# Patient Record
Sex: FEMALE | Race: BLACK OR AFRICAN AMERICAN | NOT HISPANIC OR LATINO | Employment: FULL TIME | URBAN - METROPOLITAN AREA
[De-identification: names, ages, dates, MRNs, and addresses within clinical notes are randomized per-mention and may not be internally consistent; named-entity substitution may affect disease eponyms.]

---

## 2019-10-19 ENCOUNTER — HISTORICAL (OUTPATIENT)
Dept: ADMINISTRATIVE | Facility: HOSPITAL | Age: 24
End: 2019-10-19

## 2019-10-19 LAB
BILIRUB SERPL-MCNC: NEGATIVE MG/DL
BLOOD URINE, POC: NORMAL
CLARITY, POC UA: CLEAR
COLOR, POC UA: NORMAL
GLUCOSE UR QL STRIP: NEGATIVE
KETONES UR QL STRIP: NEGATIVE
LEUKOCYTE EST, POC UA: NORMAL
NITRITE, POC UA: NEGATIVE
PH, POC UA: 8.5
PROTEIN, POC: NORMAL
SPECIFIC GRAVITY, POC UA: 1.01
UROBILINOGEN, POC UA: NORMAL

## 2019-10-21 LAB — FINAL CULTURE: NO GROWTH

## 2020-05-22 ENCOUNTER — HISTORICAL (OUTPATIENT)
Dept: ADMINISTRATIVE | Facility: HOSPITAL | Age: 25
End: 2020-05-22

## 2020-05-22 LAB
ALBUMIN SERPL-MCNC: 4.5 G/DL (ref 3.9–5)
ALBUMIN/GLOB SERPL: 1.5 {RATIO} (ref 1.2–2.2)
ALP SERPL-CCNC: 45 IU/L (ref 39–117)
ALT SERPL-CCNC: 10 IU/L (ref 0–32)
AST SERPL-CCNC: 15 IU/L (ref 0–40)
BASOPHILS # BLD AUTO: 0 X10E3/UL (ref 0–0.2)
BASOPHILS NFR BLD AUTO: 1 %
BILIRUB SERPL-MCNC: 0.7 MG/DL (ref 0–1.2)
BUN SERPL-MCNC: 13 MG/DL (ref 6–20)
CALCIUM SERPL-MCNC: 9.7 MG/DL (ref 8.7–10.2)
CHLORIDE SERPL-SCNC: 103 MMOL/L (ref 96–106)
CHOLEST SERPL-MCNC: 159 MG/DL (ref 100–199)
CHOLEST/HDLC SERPL: 3.5 RATIO (ref 0–4.4)
CO2 SERPL-SCNC: 22 MMOL/L (ref 20–29)
CREAT SERPL-MCNC: 0.78 MG/DL (ref 0.57–1)
CREAT/UREA NIT SERPL: 17 (ref 9–23)
DEPRECATED CALCIDIOL+CALCIFEROL SERPL-MC: 44.1 NG/ML (ref 30–100)
EOSINOPHIL # BLD AUTO: 0 X10E3/UL (ref 0–0.4)
EOSINOPHIL NFR BLD AUTO: 1 %
ERYTHROCYTE [DISTWIDTH] IN BLOOD BY AUTOMATED COUNT: 12.7 % (ref 11.7–15.4)
GLOBULIN SER-MCNC: 3 G/DL (ref 1.5–4.5)
GLUCOSE SERPL-MCNC: 74 MG/DL (ref 65–99)
HCT VFR BLD AUTO: 39.8 % (ref 34–46.6)
HDLC SERPL-MCNC: 45 MG/DL
HGB BLD-MCNC: 13.1 G/DL (ref 11.1–15.9)
LDLC SERPL CALC-MCNC: 100 MG/DL (ref 0–99)
LYMPHOCYTES # BLD AUTO: 1.9 X10E3/UL (ref 0.7–3.1)
LYMPHOCYTES NFR BLD AUTO: 27 %
MCH RBC QN AUTO: 31 PG (ref 26.6–33)
MCHC RBC AUTO-ENTMCNC: 32.9 G/DL (ref 31.5–35.7)
MCV RBC AUTO: 94 FL (ref 79–97)
MONOCYTES # BLD AUTO: 0.3 X10E3/UL (ref 0.1–0.9)
MONOCYTES NFR BLD AUTO: 5 %
NEUTROPHILS # BLD AUTO: 4.7 X10E3/UL (ref 1.4–7)
NEUTROPHILS NFR BLD AUTO: 66 %
PLATELET # BLD AUTO: 378 X10E3/UL (ref 150–450)
POTASSIUM SERPL-SCNC: 4.4 MMOL/L (ref 3.5–5.2)
PROT SERPL-MCNC: 7.5 G/DL (ref 6–8.5)
RBC # BLD AUTO: 4.23 X10(6)/MCL (ref 3.77–5.28)
SODIUM SERPL-SCNC: 136 MMOL/L (ref 134–144)
TRIGL SERPL-MCNC: 69 MG/DL (ref 0–149)
TSH SERPL-ACNC: 0.68 MIU/ML (ref 0.45–4.5)
VLDLC SERPL CALC-MCNC: 14 MG/DL (ref 5–40)
WBC # SPEC AUTO: 7 X10E3/UL (ref 3.4–10.8)

## 2020-10-15 ENCOUNTER — HISTORICAL (OUTPATIENT)
Dept: RADIOLOGY | Facility: HOSPITAL | Age: 25
End: 2020-10-15

## 2021-04-19 LAB
BILIRUB SERPL-MCNC: NEGATIVE MG/DL
BLOOD URINE, POC: NORMAL
CLARITY, POC UA: CLEAR
COLOR, POC UA: YELLOW
GLUCOSE UR QL STRIP: NEGATIVE
KETONES UR QL STRIP: NEGATIVE
LEUKOCYTE EST, POC UA: NEGATIVE
NITRITE, POC UA: NEGATIVE
PH, POC UA: 7
POC BETA-HCG (QUAL): NEGATIVE
PROTEIN, POC: NEGATIVE
SPECIFIC GRAVITY, POC UA: 1.01
UROBILINOGEN, POC UA: NORMAL

## 2021-06-10 ENCOUNTER — HISTORICAL (OUTPATIENT)
Dept: ADMINISTRATIVE | Facility: HOSPITAL | Age: 26
End: 2021-06-10

## 2021-06-10 LAB
ABS NEUT (OLG): 2.06 X10(3)/MCL (ref 2.1–9.2)
ALBUMIN SERPL-MCNC: 3.4 GM/DL (ref 3.5–5)
ALBUMIN/GLOB SERPL: 1 RATIO (ref 1.1–2)
ALP SERPL-CCNC: 50 UNIT/L (ref 40–150)
ALT SERPL-CCNC: 6 UNIT/L (ref 0–55)
AST SERPL-CCNC: 13 UNIT/L (ref 5–34)
BASOPHILS # BLD AUTO: 0 X10(3)/MCL (ref 0–0.2)
BASOPHILS NFR BLD AUTO: 0 %
BILIRUB SERPL-MCNC: 0.9 MG/DL
BILIRUBIN DIRECT+TOT PNL SERPL-MCNC: 0.3 MG/DL (ref 0–0.5)
BILIRUBIN DIRECT+TOT PNL SERPL-MCNC: 0.6 MG/DL (ref 0–0.8)
BUN SERPL-MCNC: 9.1 MG/DL (ref 7–18.7)
CALCIUM SERPL-MCNC: 9 MG/DL (ref 8.4–10.2)
CHLORIDE SERPL-SCNC: 107 MMOL/L (ref 98–107)
CHOLEST SERPL-MCNC: 158 MG/DL
CHOLEST/HDLC SERPL: 4 {RATIO} (ref 0–5)
CO2 SERPL-SCNC: 24 MMOL/L (ref 22–29)
CREAT SERPL-MCNC: 0.71 MG/DL (ref 0.55–1.02)
DEPRECATED CALCIDIOL+CALCIFEROL SERPL-MC: 50.4 NG/ML (ref 30–80)
EOSINOPHIL # BLD AUTO: 0.1 X10(3)/MCL (ref 0–0.9)
EOSINOPHIL NFR BLD AUTO: 2 %
ERYTHROCYTE [DISTWIDTH] IN BLOOD BY AUTOMATED COUNT: 12.6 % (ref 11.5–17)
GLOBULIN SER-MCNC: 3.5 GM/DL (ref 2.4–3.5)
GLUCOSE SERPL-MCNC: 72 MG/DL (ref 74–100)
HCT VFR BLD AUTO: 38.5 % (ref 37–47)
HDLC SERPL-MCNC: 39 MG/DL (ref 35–60)
HGB BLD-MCNC: 12.9 GM/DL (ref 12–16)
IRON SATN MFR SERPL: 43 % (ref 20–50)
IRON SERPL-MCNC: 144 UG/DL (ref 50–170)
LDLC SERPL CALC-MCNC: 102 MG/DL (ref 50–140)
LYMPHOCYTES # BLD AUTO: 1.5 X10(3)/MCL (ref 0.6–4.6)
LYMPHOCYTES NFR BLD AUTO: 38 %
MCH RBC QN AUTO: 31 PG (ref 27–31)
MCHC RBC AUTO-ENTMCNC: 33.5 GM/DL (ref 33–36)
MCV RBC AUTO: 92.5 FL (ref 80–94)
MONOCYTES # BLD AUTO: 0.3 X10(3)/MCL (ref 0.1–1.3)
MONOCYTES NFR BLD AUTO: 7 %
NEUTROPHILS # BLD AUTO: 2.06 X10(3)/MCL (ref 2.1–9.2)
NEUTROPHILS NFR BLD AUTO: 53 %
PLATELET # BLD AUTO: 409 X10(3)/MCL (ref 130–400)
PMV BLD AUTO: 8.9 FL (ref 9.4–12.4)
POTASSIUM SERPL-SCNC: 4.3 MMOL/L (ref 3.5–5.1)
PROT SERPL-MCNC: 6.9 GM/DL (ref 6.4–8.3)
RBC # BLD AUTO: 4.16 X10(6)/MCL (ref 4.2–5.4)
SODIUM SERPL-SCNC: 139 MMOL/L (ref 136–145)
TIBC SERPL-MCNC: 191 UG/DL (ref 70–310)
TIBC SERPL-MCNC: 335 UG/DL (ref 250–450)
TRANSFERRIN SERPL-MCNC: 298 MG/DL (ref 180–382)
TRIGL SERPL-MCNC: 87 MG/DL (ref 37–140)
TSH SERPL-ACNC: 1.25 UIU/ML (ref 0.35–4.94)
VLDLC SERPL CALC-MCNC: 17 MG/DL
WBC # SPEC AUTO: 3.9 X10(3)/MCL (ref 4.5–11.5)

## 2021-07-12 LAB
BILIRUB SERPL-MCNC: NORMAL MG/DL
BLOOD URINE, POC: NEGATIVE
CLARITY, POC UA: CLEAR
COLOR, POC UA: YELLOW
GLUCOSE UR QL STRIP: NEGATIVE
KETONES UR QL STRIP: NEGATIVE
LEUKOCYTE EST, POC UA: NEGATIVE
NITRITE, POC UA: NEGATIVE
PH, POC UA: 7
PROTEIN, POC: NEGATIVE
SPECIFIC GRAVITY, POC UA: 1.01
UROBILINOGEN, POC UA: NORMAL

## 2021-10-17 ENCOUNTER — HISTORICAL (OUTPATIENT)
Dept: URGENT CARE | Facility: CLINIC | Age: 26
End: 2021-10-17

## 2022-01-04 ENCOUNTER — HISTORICAL (OUTPATIENT)
Dept: URGENT CARE | Facility: CLINIC | Age: 27
End: 2022-01-04

## 2022-01-04 LAB
APPEARANCE, UA: CLEAR
BACTERIA SPEC CULT: ABNORMAL /HPF
BILIRUB SERPL-MCNC: NEGATIVE MG/DL
BILIRUB UR QL STRIP: NEGATIVE
BLOOD URINE, POC: NORMAL
CLARITY, POC UA: NORMAL
COLOR UR: YELLOW
COLOR, POC UA: NORMAL
GLUCOSE (UA): NEGATIVE
GLUCOSE UR QL STRIP: NEGATIVE
HGB UR QL STRIP: ABNORMAL
KETONES UR QL STRIP: NEGATIVE
KETONES UR QL STRIP: NEGATIVE
LEUKOCYTE EST, POC UA: NORMAL
LEUKOCYTE ESTERASE UR QL STRIP: ABNORMAL
NITRITE UR QL STRIP: NEGATIVE
NITRITE, POC UA: NEGATIVE
PH UR STRIP: 7 [PH] (ref 5–9)
PH, POC UA: 6.5
PROT UR QL STRIP: NEGATIVE
PROTEIN, POC: NEGATIVE
RAPID GROUP A STREP (OHS): NEGATIVE
RBC #/AREA URNS HPF: 21 /HPF (ref 0–2)
SARS-COV-2 RNA RESP QL NAA+PROBE: POSITIVE
SP GR UR STRIP: 1.02 (ref 1–1.03)
SPECIFIC GRAVITY, POC UA: 1.01
SQUAMOUS EPITHELIAL, UA: ABNORMAL /HPF (ref 0–4)
UROBILINOGEN UR STRIP-ACNC: 1
UROBILINOGEN, POC UA: NORMAL
WBC #/AREA URNS AUTO: ABNORMAL /HPF (ref 0–3)
WBC #/AREA URNS HPF: ABNORMAL /[HPF]

## 2022-01-14 ENCOUNTER — HISTORICAL (OUTPATIENT)
Dept: PREADMISSION TESTING | Facility: HOSPITAL | Age: 27
End: 2022-01-14

## 2022-01-14 LAB
% NEUTROPHILS (OHS): 50 %
ABS NEUT (OLG): 2.19 X10(3)/MCL (ref 2.1–9.2)
BASOPHILS # BLD AUTO: 0 X10(3)/MCL (ref 0–0.2)
BASOPHILS NFR BLD AUTO: 1 %
BASOPHILS NFR BLD: 0 K/UL (ref 0–0.2)
BASOPHILS NFR BLD: 1 % (ref 0–3)
EOSINOPHIL # BLD AUTO: 0 X10(3)/MCL (ref 0–0.9)
EOSINOPHIL NFR BLD AUTO: 1 %
EOSINOPHIL NFR BLD: 0 K/UL (ref 0–0.9)
EOSINOPHIL NFR BLD: 1 %
ERYTHROCYTE [DISTWIDTH] IN BLOOD BY AUTOMATED COUNT: 12.3 % (ref 11.5–17)
HCT VFR BLD AUTO: 37.2 % (ref 37–47)
HCT VFR BLD AUTO: 37.2 % (ref 37–47)
HGB BLD-MCNC: 12.2 G/DL (ref 12–16)
HGB BLD-MCNC: 12.2 GM/DL (ref 12–16)
LYMPH #: 1.7 K/UL (ref 0.6–4.6)
LYMPH%: 40 % (ref 18–52)
LYMPHOCYTES # BLD AUTO: 1.7 X10(3)/MCL (ref 0.6–4.6)
LYMPHOCYTES NFR BLD AUTO: 40 %
MCH RBC QN AUTO: 30.9 PG (ref 27–31)
MCH RBC QN AUTO: 30.9 PG (ref 27–31)
MCHC RBC AUTO-ENTMCNC: 32.8 G/DL (ref 33–36)
MCHC RBC AUTO-ENTMCNC: 32.8 GM/DL (ref 33–36)
MCV RBC AUTO: 94.2 FL (ref 80–94)
MCV RBC AUTO: 94.2 FL (ref 80–94)
MONO #: 0.4 K/UL (ref 0.1–1.3)
MONO%: 8 % (ref 3–10)
MONOCYTES # BLD AUTO: 0.4 X10(3)/MCL (ref 0.1–1.3)
MONOCYTES NFR BLD AUTO: 8 %
MPC BLD CALC-MCNC: 9.2 FL (ref 9.4–12.4)
NEUTROPHILS # BLD AUTO: 2.19 X10(3)/MCL (ref 2.1–9.2)
NEUTROPHILS - ABS (DIFF): 2 /ΜL (ref 2–9)
NEUTROPHILS NFR BLD AUTO: 50 %
PLATELET # BLD AUTO: 404 K/UL (ref 130–400)
PLATELET # BLD AUTO: 404 X10(3)/MCL (ref 130–400)
PMV BLD AUTO: 9.2 FL (ref 9.4–12.4)
RBC # BLD AUTO: 3.95 M/UL (ref 4.2–5.4)
RBC # BLD AUTO: 3.95 X10(6)/MCL (ref 4.2–5.4)
RDW-CV: 12.3 % (ref 11.5–17)
WBC # SPEC AUTO: 4.3 X10(3)/MCL (ref 4.5–11.5)
WBC: 4.3 K/UL (ref 4.5–11.5)

## 2022-04-10 ENCOUNTER — HISTORICAL (OUTPATIENT)
Dept: ADMINISTRATIVE | Facility: HOSPITAL | Age: 27
End: 2022-04-10
Payer: COMMERCIAL

## 2022-04-27 VITALS
HEIGHT: 63 IN | DIASTOLIC BLOOD PRESSURE: 72 MMHG | SYSTOLIC BLOOD PRESSURE: 114 MMHG | OXYGEN SATURATION: 99 % | WEIGHT: 140 LBS | BODY MASS INDEX: 24.8 KG/M2

## 2022-05-03 NOTE — HISTORICAL [facility]
Ongoing SW/CM Assessment/Plan of Care Note     See SW/CM flowsheets for goals and other objective data.    Patient/Family discharge goal (s):  Goal #1: Communication facilitated  Goal #2: Home Care arranged or issues addressed  Goal #3: Transportation arranged or issues addressed    PT Recommendation:  Recommendation for Discharge: PT: Home(with supervision of spouse/family)    OT Recommendation:  Recommendations for Discharge: OT: Home, Home therapy, 24 Hour assist    SLP Recommendation:       Disposition:   Home with home care    Progress note:   Case discussed in OFTs. Possible weekend d/c pending progress. Writer met with patient and spouse at bedside. 2nd copies of Important Message from Medicare (IMM) forms given and explained.  Copies to be scanned into medical record.  Patient / representative verbalized understanding. Copy signed.    Dr. Solorio will enter home care orders upon d/c. Home care liaison updated on possible weekend d/c. Information added to AVS.         This is a historical note converted from [employer]. Formatting and pictures may have been removed.  Please reference [employer] for original formatting and attached multimedia. Chief Complaint  itching burning and redness x 7 days (used monostat no change)  History of Present Illness  Pt is a 25 yo female, here today for burning, itching, redness x 7 days. Tried monistat with no relief.? Patient states that she has had a yeast infection previously?and her symptoms today are the same.? Denies any new sexual partners, declines STD testing today.? Denies?body aches, fever,?abdominal pain, back pain,?vaginal discharge.  Review of Systems  Constitutional: negative except as stated in HPI  Eye: negative except as stated in HPI  ENT: negative except as stated in HPI  Respiratory: negative except as stated in HPI  Cardiovascular: negative except as stated in HPI  Gastrointestinal: negative except as stated in HPI  Genitourinary: negative except as stated in HPI  Hema/Lymph: negative except as stated in HPI  Endocrine: negative except as stated in HPI  Immunologic: negative except as stated in HPI  Musculoskeletal: negative except as stated in HPI  Integumentary: negative except as stated in HPI  Neurologic: negative except as stated in HPI  All Other ROS_ negative except as stated in HPI  ?  ?  Physical Exam  Vitals & Measurements  T:?36.4? ?C (Oral)? HR:?75(Peripheral)? RR:?20? BP:?116/75? SpO2:?100%?  HT:?160?cm? WT:?64?kg? BMI:?25? LMP:?09/27/2019 00:00 CDT?  ?General Appearance: well developed, well nourished female in no acute distress.  Respiratory: Lungs are clear to auscultation, Respirations are non-labored, Breath sounds are equal, Symmetrical chest wall expansion.  Cardiovascular: Normal rate, Regular rhythm, No murmur.  Abdomen: no mass, significant tenderness or organomegaly  Pelvic: external genitalia is normal.?Discharge?white, wet prep performed, no cervical motion tenderness, adnexae nontender, no masses. Odor?not  present  Genitourinary: no flank pain.  Neurologic: No focal deficits, Cranial Nerves II-XII are grossly intact.  Assessment/Plan  1.?Vaginal yeast infection?B37.3  ?Wet prep performed and sent to lab.?POC UA + leuk, - nit, will send to lab for C&S. ?Diflucan 150 mg p.o. once, may take second dose in 3 days.???Avoid moisture retaining products near vagina, nylon underwear, pantiliners, vaginal lubricants or spermicides.??F/u with pcp. ER precautions.  Ordered:  After Hrs Visit 20054 PC, Vaginal yeast infection  Vaginal itching, 10/19/19 9:50:00 CDT  Office/Outpatient Visit Level 4 Established [id number] PC, Vaginal yeast infection  Vaginal itching, 10/19/19 9:50:00 CDT  Urine Culture [id number], Stat collect, 10/19/19 9:47:00 CDT, Urine, Nurse collect, Stop date 10/19/19 9:48:00 CDT, Vaginal yeast infection  Vaginal itching  Wet Prep Smear, Stat collect, Vaginal, 10/19/19 9:46:00 CDT, Stop date 10/19/19 9:46:00 CDT, Nurse collect, Vaginal yeast infection  Vaginal itching, 10/19/19 9:46:00 CDT  ?  2.?Vaginal itching?N89.8  POC as above  Ordered:  After Hrs Visit 98054 PC, Vaginal yeast infection  Vaginal itching, 10/19/19 9:50:00 CDT  Office/Outpatient Visit Level 4 Established 18617 PC, Vaginal yeast infection  Vaginal itching, 10/19/19 9:50:00 CDT  Urine Culture [id number], Stat collect, 10/19/19 9:47:00 CDT, Urine, Nurse collect, Stop date 10/19/19 9:48:00 CDT, Vaginal yeast infection  Vaginal itching  Wet Prep Smear, Stat collect, Vaginal, 10/19/19 9:46:00 CDT, Stop date 10/19/19 9:46:00 CDT, Nurse collect, Vaginal yeast infection  Vaginal itching, 10/19/19 9:46:00 CDT  ?   Problem List/Past Medical History  Ongoing  No chronic problems  Historical  No qualifying data  Procedure/Surgical History  denies   Medications  alPRAzolam 0.25 mg oral tab, 0.25 mg= 1 tab(s), Oral, TID  busPIRone 5 mg oral tablet, 5 mg= 1 tab(s), Oral, Daily  cetirizine-pseudoephedrine 5 mg-120 mg oral tablet, extended release, 1 tab(s), Oral,  BID,? ?Not Taking, Completed Rx: pt stated she is currently not taking this medication  ethinyl estradiol-levonorgestrel 20 mcg-100 mcg oral tablet, 1 tab(s), Oral, Daily,? ?Not Taking per Prescriber  Promethazine DM 6.25 mg-15 mg/5 mL oral syrup, 5 mL, Oral, q6hr, PRN,? ?Not Taking, Completed Rx: pt stated she is done taking this medicationpt stated she is currently not taking this medication  Seasonique oral tablet, 1 tab(s), Oral, Daily  Allergies  No Known Medication Allergies  Social History  Abuse/Neglect  No, 10/19/2019  Alcohol  Current, 1-2 times per month, 03/26/2019  Tobacco  Never (less than 100 in lifetime), N/A, Household tobacco concerns: No., 10/19/2019  Health Maintenance  Health Maintenance  ???Pending?(in the next year)  ??? ??OverDue  ??? ? ? ?Alcohol Misuse Screening due??01/01/19??and every 1??year(s)  ??? ??Due?  ??? ? ? ?ADL Screening due??10/19/19??and every 1??year(s)  ??? ? ? ?Tetanus Vaccine due??10/19/19??and every 10??year(s)  ??? ??Due In Future?  ??? ? ? ?Obesity Screening not due until??01/01/20??and every 1??year(s)  ???Satisfied?(in the past 1 year)  ??? ??Satisfied?  ??? ? ? ?Blood Pressure Screening on??10/19/19.??Satisfied by Lis Womack LPN  ??? ? ? ?Body Mass Index Check on??10/19/19.??Satisfied by Lis Womack LPN  ??? ? ? ?Influenza Vaccine on??10/19/19.??Satisfied by Lis Womack LPN  ??? ? ? ?Obesity Screening on??10/19/19.??Satisfied by Lis Womack LPN  ?  Diagnostic Results  Urinalysis????????  Color Ur Dipstick????????Dark yellow  Appearance Ur Dipstick????????Clear  pH Ur Dipstick????????8.5  Specific Gravity Ur Dipstick????????1.015  Blood Ur Dipstick????????Trace  Glucose Ur Dipstick????????Negative  Ketones Ur Dipstick????????Negative  Protein Ur Dipstick????????1+ (30 mg/dl)  Bilirubin Ur Dipstick????????Negative  Urobilinogen Ur Dipstick????????1 mg/dl  Leukocytes Ur Dipstick????????Small  Nitrite Ur Dipstick????????Negative  ?

## 2022-05-19 LAB — PAP RECOMMENDATION EXT: NORMAL

## 2022-06-03 DIAGNOSIS — Z00.00 ANNUAL PHYSICAL EXAM: Primary | ICD-10-CM

## 2022-06-03 PROBLEM — F41.1 GENERALIZED ANXIETY DISORDER: Status: ACTIVE | Noted: 2022-06-03

## 2022-06-03 PROBLEM — K63.5 POLYP OF COLON: Status: ACTIVE | Noted: 2022-06-03

## 2022-06-03 PROBLEM — N92.0 MENORRHAGIA: Status: ACTIVE | Noted: 2022-06-03

## 2022-06-03 PROBLEM — D25.9 UTERINE FIBROID: Status: ACTIVE | Noted: 2022-06-03

## 2022-06-03 PROBLEM — K58.9 IRRITABLE BOWEL SYNDROME: Status: ACTIVE | Noted: 2022-06-03

## 2022-06-03 PROBLEM — K52.9 CHRONIC DIARRHEA: Status: ACTIVE | Noted: 2022-06-03

## 2022-06-03 PROBLEM — N94.6 DYSMENORRHEA: Status: ACTIVE | Noted: 2022-06-03

## 2022-06-14 ENCOUNTER — OFFICE VISIT (OUTPATIENT)
Dept: FAMILY MEDICINE | Facility: CLINIC | Age: 27
End: 2022-06-14
Payer: COMMERCIAL

## 2022-06-14 VITALS
OXYGEN SATURATION: 98 % | DIASTOLIC BLOOD PRESSURE: 74 MMHG | SYSTOLIC BLOOD PRESSURE: 104 MMHG | HEART RATE: 72 BPM | HEIGHT: 63 IN | BODY MASS INDEX: 27.07 KG/M2 | TEMPERATURE: 99 F | RESPIRATION RATE: 16 BRPM | WEIGHT: 152.81 LBS

## 2022-06-14 DIAGNOSIS — Z11.59 NEED FOR HEPATITIS C SCREENING TEST: ICD-10-CM

## 2022-06-14 DIAGNOSIS — N30.01 ACUTE CYSTITIS WITH HEMATURIA: ICD-10-CM

## 2022-06-14 DIAGNOSIS — R10.9 ABDOMINAL PAIN, UNSPECIFIED ABDOMINAL LOCATION: ICD-10-CM

## 2022-06-14 DIAGNOSIS — Z00.00 ANNUAL PHYSICAL EXAM: Primary | ICD-10-CM

## 2022-06-14 LAB
BILIRUB SERPL-MCNC: ABNORMAL MG/DL
BLOOD URINE, POC: ABNORMAL
CLARITY, POC UA: CLEAR
COLOR, POC UA: ABNORMAL
GLUCOSE UR QL STRIP: ABNORMAL
KETONES UR QL STRIP: ABNORMAL
LEUKOCYTE ESTERASE URINE, POC: ABNORMAL
NITRITE, POC UA: ABNORMAL
PH, POC UA: 5.5
PROTEIN, POC: ABNORMAL
SPECIFIC GRAVITY, POC UA: 1.01
UROBILINOGEN, POC UA: ABNORMAL

## 2022-06-14 PROCEDURE — 1160F PR REVIEW ALL MEDS BY PRESCRIBER/CLIN PHARMACIST DOCUMENTED: ICD-10-PCS | Mod: CPTII,,, | Performed by: NURSE PRACTITIONER

## 2022-06-14 PROCEDURE — 3074F PR MOST RECENT SYSTOLIC BLOOD PRESSURE < 130 MM HG: ICD-10-PCS | Mod: CPTII,,, | Performed by: NURSE PRACTITIONER

## 2022-06-14 PROCEDURE — 1159F MED LIST DOCD IN RCRD: CPT | Mod: CPTII,,, | Performed by: NURSE PRACTITIONER

## 2022-06-14 PROCEDURE — 81002 POCT URINE DIPSTICK WITHOUT MICROSCOPE: ICD-10-PCS | Mod: ,,, | Performed by: NURSE PRACTITIONER

## 2022-06-14 PROCEDURE — 99395 PR PREVENTIVE VISIT,EST,18-39: ICD-10-PCS | Mod: ,,, | Performed by: NURSE PRACTITIONER

## 2022-06-14 PROCEDURE — 3078F DIAST BP <80 MM HG: CPT | Mod: CPTII,,, | Performed by: NURSE PRACTITIONER

## 2022-06-14 PROCEDURE — 81002 URINALYSIS NONAUTO W/O SCOPE: CPT | Mod: ,,, | Performed by: NURSE PRACTITIONER

## 2022-06-14 PROCEDURE — 1160F RVW MEDS BY RX/DR IN RCRD: CPT | Mod: CPTII,,, | Performed by: NURSE PRACTITIONER

## 2022-06-14 PROCEDURE — 87088 URINE BACTERIA CULTURE: CPT | Performed by: NURSE PRACTITIONER

## 2022-06-14 PROCEDURE — 1159F PR MEDICATION LIST DOCUMENTED IN MEDICAL RECORD: ICD-10-PCS | Mod: CPTII,,, | Performed by: NURSE PRACTITIONER

## 2022-06-14 PROCEDURE — 3074F SYST BP LT 130 MM HG: CPT | Mod: CPTII,,, | Performed by: NURSE PRACTITIONER

## 2022-06-14 PROCEDURE — 3008F PR BODY MASS INDEX (BMI) DOCUMENTED: ICD-10-PCS | Mod: CPTII,,, | Performed by: NURSE PRACTITIONER

## 2022-06-14 PROCEDURE — 3078F PR MOST RECENT DIASTOLIC BLOOD PRESSURE < 80 MM HG: ICD-10-PCS | Mod: CPTII,,, | Performed by: NURSE PRACTITIONER

## 2022-06-14 PROCEDURE — 3008F BODY MASS INDEX DOCD: CPT | Mod: CPTII,,, | Performed by: NURSE PRACTITIONER

## 2022-06-14 PROCEDURE — 99395 PREV VISIT EST AGE 18-39: CPT | Mod: ,,, | Performed by: NURSE PRACTITIONER

## 2022-06-14 RX ORDER — NITROFURANTOIN 25; 75 MG/1; MG/1
100 CAPSULE ORAL 2 TIMES DAILY
Qty: 14 CAPSULE | Refills: 0 | Status: SHIPPED | OUTPATIENT
Start: 2022-06-14 | End: 2023-03-07 | Stop reason: ALTCHOICE

## 2022-06-14 RX ORDER — OXYCODONE AND ACETAMINOPHEN 5; 325 MG/1; MG/1
1 TABLET ORAL EVERY 6 HOURS PRN
COMMUNITY
Start: 2022-01-17 | End: 2023-03-07 | Stop reason: ALTCHOICE

## 2022-06-14 RX ORDER — IBUPROFEN 600 MG/1
600 TABLET ORAL EVERY 6 HOURS PRN
COMMUNITY
Start: 2022-01-14 | End: 2023-03-07

## 2022-06-14 RX ORDER — TERCONAZOLE 8 MG/G
CREAM VAGINAL
COMMUNITY
Start: 2022-06-06 | End: 2023-03-07

## 2022-06-14 NOTE — PATIENT INSTRUCTIONS
Take Macrobid until complete. We will call you if antibiotic needs to be changed after we receive urine culture results.

## 2022-06-14 NOTE — PROGRESS NOTES
Subjective:       Patient ID: Roz Leonard is a 26 y.o. female.    Chief Complaint: Annual Exam and Urinary Tract Infection (Lower abdominal pressure x 1 and a half weeks)      HPI     This is a 26-year-old white female who presents to clinic today for an annual wellness exam.  Patient has a history of dysmenorrhea, menorrhagia, uterine fibroid, irritable bowel syndrome, colon polyp.  Patient states that she is currently being treated for chronic [place] infection by her OBGYN.  Has been having lower abdominal/bladder pain pressure for the last week and a half.  Also some urinary frequency.  No dysuria.      Review of Systems   Constitutional: Negative.    HENT: Negative.    Eyes: Negative.    Respiratory: Negative.    Cardiovascular: Negative.    Gastrointestinal: Positive for abdominal pain.   Endocrine: Negative.    Genitourinary: Positive for frequency and urgency.   Musculoskeletal: Negative.    Integumentary:  Negative.   Allergic/Immunologic: Negative.    Neurological: Negative.    Hematological: Negative.    Psychiatric/Behavioral: Negative.    All other systems reviewed and are negative.          The patient's Health Maintenance was reviewed and the following appears to be due:   Health Maintenance Due   Topic Date Due    Hepatitis C Screening  Never done    Pneumococcal Vaccines (Age 0-64) (1 - PCV) Never done    HIV Screening  Never done    TETANUS VACCINE  08/03/2017    COVID-19 Vaccine (3 - Booster for Moderna series) 09/01/2021       Past Medical History:  Past Medical History:   Diagnosis Date    Acute pelvic pain     Anxiety disorder, unspecified     Chronic diarrhea     Dysmenorrhea, unspecified     Fibroids     IBS (irritable bowel syndrome)     Menorrhagia     Personal history of colonic polyps      Past Surgical History:   Procedure Laterality Date    COLONOSCOPY  06/08/2021    MYOMECTOMY  01/18/2022     Review of patient's allergies indicates:   Allergen Reactions    Hydrocodone  "Anxiety     Current Outpatient Medications on File Prior to Visit   Medication Sig Dispense Refill    ibuprofen (ADVIL,MOTRIN) 600 MG tablet Take 600 mg by mouth every 6 (six) hours as needed.      terconazole (TERAZOL 3) 0.8 % vaginal cream Place vaginally.      oxyCODONE-acetaminophen (PERCOCET) 5-325 mg per tablet Take 1 tablet by mouth every 6 (six) hours as needed.       No current facility-administered medications on file prior to visit.     Social History     Socioeconomic History    Marital status: Single   Tobacco Use    Smoking status: Current Some Day Smoker     Types: Vaping with nicotine    Smokeless tobacco: Never Used   Substance and Sexual Activity    Alcohol use: Yes     Comment: socially    Drug use: Never    Sexual activity: Yes     History reviewed. No pertinent family history.      Objective:       /74 (BP Location: Left arm)   Pulse 72   Temp 99.1 °F (37.3 °C) (Oral)   Resp 16   Ht 5' 3" (1.6 m)   Wt 69.3 kg (152 lb 12.8 oz)   LMP 06/06/2022   SpO2 98%   BMI 27.07 kg/m²      Physical Exam  Vitals and nursing note reviewed.   Constitutional:       Appearance: Normal appearance. She is normal weight.   HENT:      Head: Normocephalic and atraumatic.      Right Ear: Tympanic membrane, ear canal and external ear normal.      Left Ear: Tympanic membrane, ear canal and external ear normal.      Nose: Nose normal.      Mouth/Throat:      Mouth: Mucous membranes are moist.      Pharynx: Oropharynx is clear.   Eyes:      Extraocular Movements: Extraocular movements intact.      Conjunctiva/sclera: Conjunctivae normal.      Pupils: Pupils are equal, round, and reactive to light.   Cardiovascular:      Rate and Rhythm: Normal rate and regular rhythm.      Heart sounds: Normal heart sounds.   Pulmonary:      Effort: Pulmonary effort is normal.      Breath sounds: Normal breath sounds.   Abdominal:      General: Abdomen is flat. Bowel sounds are normal.      Palpations: Abdomen is " soft.   Musculoskeletal:         General: Normal range of motion.      Cervical back: Normal range of motion and neck supple.   Skin:     General: Skin is warm and dry.   Neurological:      General: No focal deficit present.      Mental Status: She is alert and oriented to person, place, and time.   Psychiatric:         Mood and Affect: Mood normal.         Behavior: Behavior normal.         Thought Content: Thought content normal.         Judgment: Judgment normal.         Labs  Office Visit on 06/14/2022   Component Date Value Ref Range Status    Color, UA 06/14/2022 Straw   Final    pH, UA 06/14/2022 5.5   Final    WBC, UA 06/14/2022 trace   Final    Nitrite, UA 06/14/2022 neg   Final    Protein, POC 06/14/2022 neg   Final    Glucose, UA 06/14/2022 neg   Final    Ketones, UA 06/14/2022 neg   Final    Urobilinogen, UA 06/14/2022 neg   Final    Bilirubin, POC 06/14/2022 neg   Final    Blood, UA 06/14/2022 large   Final    Clarity, UA 06/14/2022 Clear   Final    Spec Grav UA 06/14/2022 1.015   Final             Assessment:       Problem List Items Addressed This Visit    None     Visit Diagnoses     Acute cystitis with hematuria    -  Primary    Relevant Medications    nitrofurantoin, macrocrystal-monohydrate, (MACROBID) 100 MG capsule    Other Relevant Orders    Urine culture    Annual physical exam        Abdominal pain, unspecified abdominal location        Relevant Orders    POCT URINE DIPSTICK WITHOUT MICROSCOPE (Completed)    Need for hepatitis C screening test        Relevant Orders    Hepatitis C Antibody          Plan:         1. Annual physical exam  Patient will go for fasting labs tomorrow morning, will send [email or web address] message with results.    2. Abdominal pain, unspecified abdominal location  Urinalysis in clinic  - POCT URINE DIPSTICK WITHOUT MICROSCOPE    3. Need for hepatitis C screening test  Hep C screening added to labs  - Hepatitis C Antibody; Future    4. Acute cystitis with  hematuria  Macrobid as directed, urine culture  - Urine culture  - nitrofurantoin, macrocrystal-monohydrate, (MACROBID) 100 MG capsule; Take 1 capsule (100 mg total) by mouth 2 (two) times daily.  Dispense: 14 capsule; Refill: 0

## 2022-06-17 LAB — BACTERIA UR CULT: NO GROWTH

## 2022-09-21 ENCOUNTER — HISTORICAL (OUTPATIENT)
Dept: ADMINISTRATIVE | Facility: HOSPITAL | Age: 27
End: 2022-09-21

## 2023-01-17 LAB
CHLAMYDIA BY NAA: NEGATIVE
GONOCOCCUS BY NAA: NEGATIVE
TRICH VAG BY NAA: NEGATIVE

## 2023-03-07 ENCOUNTER — OFFICE VISIT (OUTPATIENT)
Dept: FAMILY MEDICINE | Facility: CLINIC | Age: 28
End: 2023-03-07
Payer: COMMERCIAL

## 2023-03-07 ENCOUNTER — DOCUMENTATION ONLY (OUTPATIENT)
Dept: FAMILY MEDICINE | Facility: CLINIC | Age: 28
End: 2023-03-07

## 2023-03-07 VITALS
OXYGEN SATURATION: 99 % | BODY MASS INDEX: 27.22 KG/M2 | WEIGHT: 153.63 LBS | SYSTOLIC BLOOD PRESSURE: 104 MMHG | HEART RATE: 57 BPM | HEIGHT: 63 IN | DIASTOLIC BLOOD PRESSURE: 64 MMHG | RESPIRATION RATE: 16 BRPM | TEMPERATURE: 99 F

## 2023-03-07 DIAGNOSIS — R41.840 IMPAIRED ATTENTION: ICD-10-CM

## 2023-03-07 DIAGNOSIS — R53.83 FATIGUE, UNSPECIFIED TYPE: Primary | ICD-10-CM

## 2023-03-07 DIAGNOSIS — E55.9 VITAMIN D DEFICIENCY: ICD-10-CM

## 2023-03-07 DIAGNOSIS — B37.31 YEAST VAGINITIS: ICD-10-CM

## 2023-03-07 LAB
EST. AVERAGE GLUCOSE BLD GHB EST-MCNC: 102.5 MG/DL
HBA1C MFR BLD: 5.2 %
TSH SERPL-ACNC: 1.48 UIU/ML (ref 0.35–4.94)

## 2023-03-07 PROCEDURE — 84443 ASSAY THYROID STIM HORMONE: CPT | Performed by: NURSE PRACTITIONER

## 2023-03-07 PROCEDURE — 3078F DIAST BP <80 MM HG: CPT | Mod: CPTII,,, | Performed by: NURSE PRACTITIONER

## 2023-03-07 PROCEDURE — 3074F SYST BP LT 130 MM HG: CPT | Mod: CPTII,,, | Performed by: NURSE PRACTITIONER

## 2023-03-07 PROCEDURE — 99214 PR OFFICE/OUTPT VISIT, EST, LEVL IV, 30-39 MIN: ICD-10-PCS | Mod: ,,, | Performed by: NURSE PRACTITIONER

## 2023-03-07 PROCEDURE — 1159F PR MEDICATION LIST DOCUMENTED IN MEDICAL RECORD: ICD-10-PCS | Mod: CPTII,,, | Performed by: NURSE PRACTITIONER

## 2023-03-07 PROCEDURE — 3008F BODY MASS INDEX DOCD: CPT | Mod: CPTII,,, | Performed by: NURSE PRACTITIONER

## 2023-03-07 PROCEDURE — 3074F PR MOST RECENT SYSTOLIC BLOOD PRESSURE < 130 MM HG: ICD-10-PCS | Mod: CPTII,,, | Performed by: NURSE PRACTITIONER

## 2023-03-07 PROCEDURE — 3078F PR MOST RECENT DIASTOLIC BLOOD PRESSURE < 80 MM HG: ICD-10-PCS | Mod: CPTII,,, | Performed by: NURSE PRACTITIONER

## 2023-03-07 PROCEDURE — 1159F MED LIST DOCD IN RCRD: CPT | Mod: CPTII,,, | Performed by: NURSE PRACTITIONER

## 2023-03-07 PROCEDURE — 83036 HEMOGLOBIN GLYCOSYLATED A1C: CPT | Performed by: NURSE PRACTITIONER

## 2023-03-07 PROCEDURE — 1160F RVW MEDS BY RX/DR IN RCRD: CPT | Mod: CPTII,,, | Performed by: NURSE PRACTITIONER

## 2023-03-07 PROCEDURE — 36415 COLL VENOUS BLD VENIPUNCTURE: CPT | Performed by: NURSE PRACTITIONER

## 2023-03-07 PROCEDURE — 3008F PR BODY MASS INDEX (BMI) DOCUMENTED: ICD-10-PCS | Mod: CPTII,,, | Performed by: NURSE PRACTITIONER

## 2023-03-07 PROCEDURE — 99214 OFFICE O/P EST MOD 30 MIN: CPT | Mod: ,,, | Performed by: NURSE PRACTITIONER

## 2023-03-07 PROCEDURE — 1160F PR REVIEW ALL MEDS BY PRESCRIBER/CLIN PHARMACIST DOCUMENTED: ICD-10-PCS | Mod: CPTII,,, | Performed by: NURSE PRACTITIONER

## 2023-03-07 NOTE — PATIENT INSTRUCTIONS
Please call [facility] if you do not here from them in 1 week. Follow up with Dr Starkey regarding your recurrent yeast infections.

## 2023-03-07 NOTE — PROGRESS NOTES
Subjective:       Patient ID: Roz Leonard is a 27 y.o. female.    Chief Complaint: brain fog (Having difficulty with daily tasks. Forgetful and having issues with remembering things that she was told and concentrating. ), recurrent yeast infections, and Fatigue (Lack of energy)      HPI   This is a 27-year-old  female who presents to clinic today with complaint of fatigue, difficulty concentrating, difficulty remembering things.  Patient states that she is having a lot of trouble focusing both at home and at work.  She forgets a lot of things.  She has trouble concentrating while reading.  States these things are starting to bother her at work especially.  Also, complains of increased fatigue.  States she sleeps around 7 hours per night and still feels tired.  She is also concerned because she keeps getting yeast infections.  States she is seen the gyn multiple times and been given many different treatments and every time she finishes the medication the infection just comes back.    Review of Systems  Comprehensive review of systems negative except as stated in HPI    The patient's Health Maintenance was reviewed and the following appears to be due:   Health Maintenance Due   Topic Date Due    Hepatitis C Screening  Never done    HIV Screening  Never done    TETANUS VACCINE  08/03/2017    COVID-19 Vaccine (3 - Booster for Moderna series) 05/27/2021    Influenza Vaccine (1) 09/01/2022       Past Medical History:  Past Medical History:   Diagnosis Date    Acute pelvic pain     Anxiety disorder, unspecified     Chronic diarrhea     Dysmenorrhea, unspecified     Fibroids     IBS (irritable bowel syndrome)     Menorrhagia     Personal history of colonic polyps      Past Surgical History:   Procedure Laterality Date    COLONOSCOPY  06/08/2021    MYOMECTOMY  01/18/2022     Review of patient's allergies indicates:   Allergen Reactions    Hydrocodone Anxiety     Current Outpatient Medications on File Prior to  "Visit   Medication Sig Dispense Refill    Lactobacillus rhamnosus GG (CULTURELLE) 10 billion cell capsule Take 1 capsule by mouth once daily.      multivitamin capsule Take 1 capsule by mouth once daily.      [DISCONTINUED] ibuprofen (ADVIL,MOTRIN) 600 MG tablet Take 600 mg by mouth every 6 (six) hours as needed.      [DISCONTINUED] montelukast (SINGULAIR) 10 mg tablet TAKE ONE TABLET BY MOUTH DAILY 30 tablet 5    [DISCONTINUED] nitrofurantoin, macrocrystal-monohydrate, (MACROBID) 100 MG capsule Take 1 capsule (100 mg total) by mouth 2 (two) times daily. 14 capsule 0    [DISCONTINUED] oxyCODONE-acetaminophen (PERCOCET) 5-325 mg per tablet Take 1 tablet by mouth every 6 (six) hours as needed.      [DISCONTINUED] terconazole (TERAZOL 3) 0.8 % vaginal cream Place vaginally.       No current facility-administered medications on file prior to visit.     Social History     Socioeconomic History    Marital status: Single   Tobacco Use    Smoking status: Former     Types: Vaping with nicotine    Smokeless tobacco: Never   Substance and Sexual Activity    Alcohol use: Yes     Comment: socially    Drug use: Never    Sexual activity: Yes     Family History   Family history unknown: Yes       Objective:       /64 (BP Location: Left arm)   Pulse (!) 57   Temp 98.8 °F (37.1 °C) (Oral)   Resp 16   Ht 5' 3" (1.6 m)   Wt 69.7 kg (153 lb 9.6 oz)   LMP 02/22/2023 (Exact Date)   SpO2 99%   BMI 27.21 kg/m²      Physical Exam  Vitals and nursing note reviewed.   Constitutional:       Appearance: Normal appearance.   HENT:      Head: Normocephalic and atraumatic.   Eyes:      Extraocular Movements: Extraocular movements intact.      Conjunctiva/sclera: Conjunctivae normal.      Pupils: Pupils are equal, round, and reactive to light.   Cardiovascular:      Rate and Rhythm: Normal rate and regular rhythm.      Heart sounds: Normal heart sounds.   Pulmonary:      Effort: Pulmonary effort is normal.      Breath sounds: Normal " breath sounds.   Musculoskeletal:         General: Normal range of motion.      Cervical back: Normal range of motion and neck supple.   Skin:     General: Skin is warm and dry.   Neurological:      General: No focal deficit present.      Mental Status: She is alert and oriented to person, place, and time.   Psychiatric:         Mood and Affect: Mood normal.         Behavior: Behavior normal.         Thought Content: Thought content normal.         Judgment: Judgment normal.       Labs      Assessment and Plan       ICD-10-CM ICD-9-CM   1. Fatigue, unspecified type  R53.83 780.79   2. Impaired attention  R41.840 799.51   3. Yeast vaginitis  B37.31 112.1   4. Vitamin D deficiency  E55.9 268.9        1. Fatigue, unspecified type  Comments:  Will check a TSH, vitamin-D level today and call with results.  Encouraged at least 8 hours of sleep per night.  Orders:  -     TSH    2. Impaired attention  Assessment & Plan:  Refer to JASSI Clarke for evaluation    Orders:  -     Ambulatory referral/consult to Psychology; Future; Expected date: 03/14/2023    3. Yeast vaginitis  Overview:  Dr Starkey GYN      Assessment & Plan:  Per patient, she has tried multiple rounds of different oral and topical treatments and continues to get recurrent yeast infections.  She said they did prick her finger to test her sugar level in their office 1 time.  Will get a hemoglobin A1c today and call with results.  Encouraged to follow back up with Dr. Starkey.    Orders:  -     Hemoglobin A1C    4. Vitamin D deficiency  Assessment & Plan:  Will check vitamin-D level today and call with results.    Orders:  -     Vitamin D           Follow up in 3 months (on 6/14/2023) for Annual.

## 2023-03-07 NOTE — ASSESSMENT & PLAN NOTE
Per patient, she has tried multiple rounds of different oral and topical treatments and continues to get recurrent yeast infections.  She said they did prick her finger to test her sugar level in their office 1 time.  Will get a hemoglobin A1c today and call with results.  Encouraged to follow back up with Dr. Starkey.

## 2023-03-07 NOTE — LETTER
AUTHORIZATION FOR RELEASE OF   CONFIDENTIAL INFORMATION    Dear Dr. Starkey,    We are seeing Roz Leonard, date of birth 1995, in the clinic at [facility]. LONNIE North is the patient's PCP. Roz Leonard has an outstanding lab/procedure at the time we reviewed her chart. In order to help keep her health information updated, she has authorized us to request the following medical record(s):        (  )  MAMMOGRAM                                      (  )  COLONOSCOPY      ( )  PAP SMEAR                                          (  )  OUTSIDE LAB RESULTS     (  )  DEXA SCAN                                          (  )  EYE EXAM            (  )  FOOT EXAM                                          (X)  ENTIRE RECORD     (  )  OUTSIDE IMMUNIZATIONS                  (  )           Please fax records to [facility], Kathryn F Duplantis, FNP, [phone]     If you have any questions, please contact us at [phone].      Patient Name: Roz Leonard  : 1995  Patient Phone #: [phone]

## 2023-06-06 ENCOUNTER — TELEPHONE (OUTPATIENT)
Dept: FAMILY MEDICINE | Facility: CLINIC | Age: 28
End: 2023-06-06
Payer: COMMERCIAL

## 2023-06-06 DIAGNOSIS — Z00.00 ANNUAL PHYSICAL EXAM: Primary | ICD-10-CM

## 2023-06-06 DIAGNOSIS — Z11.59 NEED FOR HEPATITIS C SCREENING TEST: ICD-10-CM

## 2023-06-06 DIAGNOSIS — E55.9 VITAMIN D DEFICIENCY: ICD-10-CM

## 2023-06-06 DIAGNOSIS — Z11.4 SCREENING FOR HIV (HUMAN IMMUNODEFICIENCY VIRUS): ICD-10-CM

## 2023-06-06 NOTE — LETTER
AUTHORIZATION FOR RELEASE OF   CONFIDENTIAL INFORMATION    Dear Dr. [facility]equiel Becerra,    We are seeing Roz Leonard, date of birth 1995, in the clinic at [facility]. LONNIE North is the patient's PCP. Roz Leonard has an outstanding lab/procedure at the time we reviewed her chart. In order to help keep her health information updated, she has authorized us to request the following medical record(s):        (  )  MAMMOGRAM                                      (  )  COLONOSCOPY      (  )  PAP SMEAR                                          (  )  OUTSIDE LAB RESULTS     (  )  DEXA SCAN                                          (  )  EYE EXAM            (  )  FOOT EXAM                                          (  )  ENTIRE RECORD     (  )  OUTSIDE IMMUNIZATIONS                ( x )  PROGRESS NOTE ON REFERRED PT       Please fax records to [facility], Kathryn F Duplantis, FNP, [phone]     If you have any questions, please contact [phone]          Patient Name: Roz Leonard  : 1995  Patient Phone #: [phone]

## 2023-06-06 NOTE — TELEPHONE ENCOUNTER
Are there any outstanding tasks in patient's chart?  n    2. Do we have outstanding/pending referrals?  Dr. [facility]equiel Becerra    3. Has the patient been seen in an ER, Urgent Care, or admitted since last visit?  n    4. Has patient seen any other health care providers since last visit?  Dr. Starkey, Dr. Becerra    5.  Has patient had any blood work or x-rays done since last visit?   Pap Smear Completed  Will complete labs at [facility]    Please order wellness labs needed at [facility]

## 2023-07-11 ENCOUNTER — PATIENT MESSAGE (OUTPATIENT)
Dept: RESEARCH | Facility: HOSPITAL | Age: 28
End: 2023-07-11
Payer: COMMERCIAL